# Patient Record
Sex: FEMALE | Race: WHITE | NOT HISPANIC OR LATINO | Employment: PART TIME | URBAN - METROPOLITAN AREA
[De-identification: names, ages, dates, MRNs, and addresses within clinical notes are randomized per-mention and may not be internally consistent; named-entity substitution may affect disease eponyms.]

---

## 2019-02-20 ENCOUNTER — APPOINTMENT (OUTPATIENT)
Dept: ULTRASOUND IMAGING | Facility: HOSPITAL | Age: 34
End: 2019-02-20

## 2019-02-20 ENCOUNTER — HOSPITAL ENCOUNTER (EMERGENCY)
Facility: HOSPITAL | Age: 34
Discharge: HOME | End: 2019-02-20
Attending: EMERGENCY MEDICINE | Admitting: EMERGENCY MEDICINE

## 2019-02-20 ENCOUNTER — APPOINTMENT (OUTPATIENT)
Dept: CT IMAGING | Facility: HOSPITAL | Age: 34
End: 2019-02-20

## 2019-02-20 VITALS
DIASTOLIC BLOOD PRESSURE: 73 MMHG | TEMPERATURE: 98.2 F | BODY MASS INDEX: 24.3 KG/M2 | WEIGHT: 105 LBS | SYSTOLIC BLOOD PRESSURE: 115 MMHG | RESPIRATION RATE: 16 BRPM | OXYGEN SATURATION: 98 % | HEIGHT: 55 IN | HEART RATE: 83 BPM

## 2019-02-20 DIAGNOSIS — N94.6 DYSMENORRHEA: ICD-10-CM

## 2019-02-20 DIAGNOSIS — R10.2 PELVIC PAIN: ICD-10-CM

## 2019-02-20 DIAGNOSIS — N76.0 BV (BACTERIAL VAGINOSIS): Primary | ICD-10-CM

## 2019-02-20 DIAGNOSIS — B96.89 BV (BACTERIAL VAGINOSIS): Primary | ICD-10-CM

## 2019-02-20 LAB
ANION GAP SERPL CALCULATED.3IONS-SCNC: 5 MMOL/L (ref 3–11)
B-HCG UR QL: NEGATIVE
BASOPHILS # BLD AUTO: 0.08 10*3/MM3 (ref 0–0.2)
BASOPHILS NFR BLD AUTO: 1 % (ref 0–1)
BUN BLD-MCNC: 9 MG/DL (ref 9–23)
BUN/CREAT SERPL: 13.2 (ref 7–25)
CALCIUM SPEC-SCNC: 9.7 MG/DL (ref 8.7–10.4)
CHLORIDE SERPL-SCNC: 107 MMOL/L (ref 99–109)
CLUE CELLS SPEC QL WET PREP: ABNORMAL
CO2 SERPL-SCNC: 28 MMOL/L (ref 20–31)
CREAT BLD-MCNC: 0.68 MG/DL (ref 0.6–1.3)
DEPRECATED RDW RBC AUTO: 46.3 FL (ref 37–54)
EOSINOPHIL # BLD AUTO: 0.2 10*3/MM3 (ref 0–0.3)
EOSINOPHIL NFR BLD AUTO: 2.5 % (ref 0–3)
ERYTHROCYTE [DISTWIDTH] IN BLOOD BY AUTOMATED COUNT: 13.6 % (ref 11.3–14.5)
GFR SERPL CREATININE-BSD FRML MDRD: 101 ML/MIN/1.73
GLUCOSE BLD-MCNC: 71 MG/DL (ref 70–100)
HCT VFR BLD AUTO: 40.1 % (ref 34.5–44)
HGB BLD-MCNC: 12.9 G/DL (ref 11.5–15.5)
HYDATID CYST SPEC WET PREP: ABNORMAL
IMM GRANULOCYTES # BLD AUTO: 0.01 10*3/MM3 (ref 0–0.05)
IMM GRANULOCYTES NFR BLD AUTO: 0.1 % (ref 0–0.6)
INTERNAL NEGATIVE CONTROL: NEGATIVE
INTERNAL POSITIVE CONTROL: POSITIVE
KOH PREP NAIL: NORMAL
LYMPHOCYTES # BLD AUTO: 4.31 10*3/MM3 (ref 0.6–4.8)
LYMPHOCYTES NFR BLD AUTO: 53.5 % (ref 24–44)
Lab: NORMAL
MCH RBC QN AUTO: 30 PG (ref 27–31)
MCHC RBC AUTO-ENTMCNC: 32.2 G/DL (ref 32–36)
MCV RBC AUTO: 93.3 FL (ref 80–99)
MONOCYTES # BLD AUTO: 0.4 10*3/MM3 (ref 0–1)
MONOCYTES NFR BLD AUTO: 5 % (ref 0–12)
NEUTROPHILS # BLD AUTO: 3.07 10*3/MM3 (ref 1.5–8.3)
NEUTROPHILS NFR BLD AUTO: 38 % (ref 41–71)
PLATELET # BLD AUTO: 340 10*3/MM3 (ref 150–450)
PMV BLD AUTO: 9 FL (ref 6–12)
POTASSIUM BLD-SCNC: 3.5 MMOL/L (ref 3.5–5.5)
RBC # BLD AUTO: 4.3 10*6/MM3 (ref 3.89–5.14)
SODIUM BLD-SCNC: 140 MMOL/L (ref 132–146)
T VAGINALIS SPEC QL WET PREP: ABNORMAL
WBC NRBC COR # BLD: 8.06 10*3/MM3 (ref 3.5–10.8)
WBC SPEC QL WET PREP: ABNORMAL
YEAST GENITAL QL WET PREP: ABNORMAL

## 2019-02-20 PROCEDURE — 93976 VASCULAR STUDY: CPT

## 2019-02-20 PROCEDURE — 99284 EMERGENCY DEPT VISIT MOD MDM: CPT

## 2019-02-20 PROCEDURE — 81025 URINE PREGNANCY TEST: CPT | Performed by: EMERGENCY MEDICINE

## 2019-02-20 PROCEDURE — 25010000002 IOPAMIDOL 61 % SOLUTION: Performed by: EMERGENCY MEDICINE

## 2019-02-20 PROCEDURE — 96361 HYDRATE IV INFUSION ADD-ON: CPT

## 2019-02-20 PROCEDURE — 80048 BASIC METABOLIC PNL TOTAL CA: CPT | Performed by: PHYSICIAN ASSISTANT

## 2019-02-20 PROCEDURE — 85025 COMPLETE CBC W/AUTO DIFF WBC: CPT | Performed by: PHYSICIAN ASSISTANT

## 2019-02-20 PROCEDURE — 87220 TISSUE EXAM FOR FUNGI: CPT | Performed by: PHYSICIAN ASSISTANT

## 2019-02-20 PROCEDURE — 87491 CHLMYD TRACH DNA AMP PROBE: CPT | Performed by: PHYSICIAN ASSISTANT

## 2019-02-20 PROCEDURE — 96375 TX/PRO/DX INJ NEW DRUG ADDON: CPT

## 2019-02-20 PROCEDURE — 74177 CT ABD & PELVIS W/CONTRAST: CPT

## 2019-02-20 PROCEDURE — 25010000002 ONDANSETRON PER 1 MG: Performed by: PHYSICIAN ASSISTANT

## 2019-02-20 PROCEDURE — 81025 URINE PREGNANCY TEST: CPT

## 2019-02-20 PROCEDURE — 87591 N.GONORRHOEAE DNA AMP PROB: CPT | Performed by: PHYSICIAN ASSISTANT

## 2019-02-20 PROCEDURE — 76830 TRANSVAGINAL US NON-OB: CPT

## 2019-02-20 PROCEDURE — 25010000002 KETOROLAC TROMETHAMINE PER 15 MG: Performed by: PHYSICIAN ASSISTANT

## 2019-02-20 PROCEDURE — 96374 THER/PROPH/DIAG INJ IV PUSH: CPT

## 2019-02-20 PROCEDURE — 87210 SMEAR WET MOUNT SALINE/INK: CPT | Performed by: PHYSICIAN ASSISTANT

## 2019-02-20 RX ORDER — ONDANSETRON 2 MG/ML
4 INJECTION INTRAMUSCULAR; INTRAVENOUS ONCE
Status: COMPLETED | OUTPATIENT
Start: 2019-02-20 | End: 2019-02-20

## 2019-02-20 RX ORDER — ONDANSETRON 4 MG/1
4 TABLET, FILM COATED ORAL EVERY 8 HOURS PRN
Qty: 20 TABLET | Refills: 0 | Status: SHIPPED | OUTPATIENT
Start: 2019-02-20

## 2019-02-20 RX ORDER — NAPROXEN 375 MG/1
375 TABLET ORAL 2 TIMES DAILY PRN
Qty: 14 TABLET | Refills: 0 | Status: SHIPPED | OUTPATIENT
Start: 2019-02-20

## 2019-02-20 RX ORDER — TRAMADOL HYDROCHLORIDE 50 MG/1
50 TABLET ORAL ONCE
Status: COMPLETED | OUTPATIENT
Start: 2019-02-20 | End: 2019-02-20

## 2019-02-20 RX ORDER — KETOROLAC TROMETHAMINE 15 MG/ML
10 INJECTION, SOLUTION INTRAMUSCULAR; INTRAVENOUS ONCE
Status: COMPLETED | OUTPATIENT
Start: 2019-02-20 | End: 2019-02-20

## 2019-02-20 RX ORDER — SODIUM CHLORIDE 0.9 % (FLUSH) 0.9 %
10 SYRINGE (ML) INJECTION AS NEEDED
Status: DISCONTINUED | OUTPATIENT
Start: 2019-02-20 | End: 2019-02-21 | Stop reason: HOSPADM

## 2019-02-20 RX ORDER — METRONIDAZOLE 500 MG/1
500 TABLET ORAL 3 TIMES DAILY
Qty: 21 TABLET | Refills: 0 | Status: SHIPPED | OUTPATIENT
Start: 2019-02-20

## 2019-02-20 RX ADMIN — SODIUM CHLORIDE 500 ML: 9 INJECTION, SOLUTION INTRAVENOUS at 20:17

## 2019-02-20 RX ADMIN — ONDANSETRON 4 MG: 2 INJECTION INTRAMUSCULAR; INTRAVENOUS at 20:16

## 2019-02-20 RX ADMIN — IOPAMIDOL 85 ML: 612 INJECTION, SOLUTION INTRAVENOUS at 21:25

## 2019-02-20 RX ADMIN — KETOROLAC TROMETHAMINE 10 MG: 15 INJECTION, SOLUTION INTRAMUSCULAR; INTRAVENOUS at 20:16

## 2019-02-20 RX ADMIN — TRAMADOL HYDROCHLORIDE 50 MG: 50 TABLET, FILM COATED ORAL at 22:52

## 2019-02-20 NOTE — ED PROVIDER NOTES
Subjective   33-year-old female with history of left salpingo-oophorectomy presents to emergency department with right-sided lower pelvic pain for the past one day.  Complains of pain when walking.  She has had a BTL with history of left salpingo-oophorectomy for large cyst previously.  No vaginal discharge.  No fevers chills or sweats.  She did start her menstrual period yesterday.  She denies regional trauma.  She still has her appendix.  No other abdominal surgeries.        History provided by:  Patient  Illness   Location:  Per HPI  Quality:  Per HPI  Severity:  Mild  Onset quality:  Sudden  Duration:  1 day  Timing:  Constant  Progression:  Waxing and waning  Chronicity:  New  Context:  Per HPI  Relieved by:  Per HPI  Worsened by:  Per HPI  Ineffective treatments:  None  Associated symptoms: abdominal pain    Associated symptoms: no chest pain, no fatigue, no fever, no nausea and no vomiting        Review of Systems   Constitutional: Negative for fatigue and fever.   Cardiovascular: Negative for chest pain.   Gastrointestinal: Positive for abdominal pain. Negative for nausea and vomiting.   Genitourinary: Positive for pelvic pain and vaginal bleeding. Negative for difficulty urinating, dysuria, enuresis, flank pain, frequency, genital sores, hematuria, urgency and vaginal pain.   All other systems reviewed and are negative.      History reviewed. No pertinent past medical history.    No Known Allergies    Past Surgical History:   Procedure Laterality Date   • CARPAL TUNNEL RELEASE     • OOPHORECTOMY         History reviewed. No pertinent family history.    Social History     Socioeconomic History   • Marital status: Single     Spouse name: Not on file   • Number of children: Not on file   • Years of education: Not on file   • Highest education level: Not on file   Tobacco Use   • Smoking status: Current Every Day Smoker     Packs/day: 1.00     Types: Cigarettes   • Smokeless tobacco: Never Used   Substance and  Sexual Activity   • Alcohol use: No     Frequency: Never           Objective   Physical Exam   Constitutional: She is oriented to person, place, and time. She appears well-developed and well-nourished. No distress.   HENT:   Head: Normocephalic and atraumatic.   Right Ear: External ear normal.   Left Ear: External ear normal.   Nose: Nose normal.   Mouth/Throat: Oropharynx is clear and moist. No oropharyngeal exudate.   Eyes: Conjunctivae and EOM are normal. Pupils are equal, round, and reactive to light. Right eye exhibits no discharge. Left eye exhibits no discharge. No scleral icterus.   Neck: Normal range of motion. Neck supple. No JVD present. No tracheal deviation present. No thyromegaly present.   Cardiovascular: Normal rate, regular rhythm, normal heart sounds and intact distal pulses. Exam reveals no gallop and no friction rub.   No murmur heard.  Pulmonary/Chest: Effort normal and breath sounds normal. No stridor. No respiratory distress. She has no wheezes. She has no rales. She exhibits no tenderness.   Abdominal: Soft. Bowel sounds are normal. She exhibits no distension and no mass. There is tenderness. There is no rebound and no guarding. No hernia.   Tenderness to right lower quadrant/right suprapubic area.  No definitive McBurney's point tenderness.  Very slight left lower quadrant tenderness.  Bowel sounds are normal.  Heel tap is negative.  Abdomen is soft, no guarding, no rebound.   Genitourinary:   Genitourinary Comments: Normal external genitalia, small amount of dark dried blood at introitus.  Speculum exam reveals small amount of dark thick blood in the posterior fornix, cervical os appears patent, cervix is otherwise intact.  No lesions noted.  Swabs obtained for GC chlamydia KOH and wet prep.  No adnexal tenderness, no palpable adnexal masses.  Negative cervical motion tenderness.   Musculoskeletal: Normal range of motion. She exhibits no edema, tenderness or deformity.   Neurological: She  is alert and oriented to person, place, and time. No cranial nerve deficit. She exhibits normal muscle tone. Coordination normal.   Skin: Skin is warm and dry. No rash noted. She is not diaphoretic. No erythema. No pallor.   Psychiatric: She has a normal mood and affect. Her behavior is normal. Judgment and thought content normal.   Nursing note and vitals reviewed.      Procedures           ED Course  ED Course as of Feb 20 2320 Wed Feb 20, 2019 2057 IMPRESSION:  1. LEFT salpingo-oophorectomy, multiple small follicles along the periphery of   the RIGHT ovary questionable for polycystic ovarian disease.   2. NO evidence of RIGHT ovarian torsion.   3. Otherwise unremarkable pelvic sonogram.   [TG]      ED Course User Index  [TG] uJan Patino PA-C        Recent Results (from the past 24 hour(s))   POCT, urine preg    Collection Time: 02/20/19  5:00 PM   Result Value Ref Range    HCG, Urine, QL Negative Negative    Lot Number [id number]     Internal Positive Control Positive     Internal Negative Control Negative    CBC Auto Differential    Collection Time: 02/20/19  7:21 PM   Result Value Ref Range    WBC 8.06 3.50 - 10.80 10*3/mm3    RBC 4.30 3.89 - 5.14 10*6/mm3    Hemoglobin 12.9 11.5 - 15.5 g/dL    Hematocrit 40.1 34.5 - 44.0 %    MCV 93.3 80.0 - 99.0 fL    MCH 30.0 27.0 - 31.0 pg    MCHC 32.2 32.0 - 36.0 g/dL    RDW 13.6 11.3 - 14.5 %    RDW-SD 46.3 37.0 - 54.0 fl    MPV 9.0 6.0 - 12.0 fL    Platelets 340 150 - 450 10*3/mm3    Neutrophil % 38.0 (L) 41.0 - 71.0 %    Lymphocyte % 53.5 (H) 24.0 - 44.0 %    Monocyte % 5.0 0.0 - 12.0 %    Eosinophil % 2.5 0.0 - 3.0 %    Basophil % 1.0 0.0 - 1.0 %    Immature Grans % 0.1 0.0 - 0.6 %    Neutrophils, Absolute 3.07 1.50 - 8.30 10*3/mm3    Lymphocytes, Absolute 4.31 0.60 - 4.80 10*3/mm3    Monocytes, Absolute 0.40 0.00 - 1.00 10*3/mm3    Eosinophils, Absolute 0.20 0.00 - 0.30 10*3/mm3    Basophils, Absolute 0.08 0.00 - 0.20 10*3/mm3    Immature Grans, Absolute  0.01 0.00 - 0.05 10*3/mm3   Basic Metabolic Panel    Collection Time: 02/20/19  7:21 PM   Result Value Ref Range    Glucose 71 70 - 100 mg/dL    BUN 9 9 - 23 mg/dL    Creatinine 0.68 0.60 - 1.30 mg/dL    Sodium 140 132 - 146 mmol/L    Potassium 3.5 3.5 - 5.5 mmol/L    Chloride 107 99 - 109 mmol/L    CO2 28.0 20.0 - 31.0 mmol/L    Calcium 9.7 8.7 - 10.4 mg/dL    eGFR Non African Amer 101 >60 mL/min/1.73    BUN/Creatinine Ratio 13.2 7.0 - 25.0    Anion Gap 5.0 3.0 - 11.0 mmol/L   KOH Prep - Swab, Vagina    Collection Time: 02/20/19  8:50 PM   Result Value Ref Range    KOH Prep No yeast or hyphal elements seen No yeast or hyphal elements seen   Wet Prep, Genital - Swab, Vagina    Collection Time: 02/20/19  8:50 PM   Result Value Ref Range    YEAST No yeast seen No yeast seen    HYPHAL ELEMENTS No Hyphal elements seen No Hyphal elements seen    WBC'S 2+ WBC's seen (A) No WBC's seen    Clue Cells, Wet Prep No Clue cells seen No Clue cells seen    Trichomonas, Wet Prep No Trichomonas seen No Trichomonas seen     Note: In addition to lab results from this visit, the labs listed above may include labs taken at another facility or during a different encounter within the last 24 hours. Please correlate lab times with ED admission and discharge times for further clarification of the services performed during this visit.    CT Abdomen Pelvis With Contrast   Final Result   Unremarkable abdominopelvic study without an acute abnormality.       COMMENT:     NORMAL APPENDIX without CT evidence of acute appendicitis.       THIS DOCUMENT HAS BEEN ELECTRONICALLY SIGNED BY AD KLEIN MD      US Non-ob Transvaginal   ED Interpretation   1. LEFT salpingo-oophorectomy, multiple small follicles along the periphery of    the RIGHT ovary questionable for polycystic ovarian disease.    2. NO evidence of RIGHT ovarian torsion.    3. Otherwise unremarkable pelvic sonogram.       THIS DOCUMENT HAS BEEN ELECTRONICALLY SIGNED BY AD KLEIN MD  "     Final Result   1. LEFT salpingo-oophorectomy, multiple small follicles along the periphery of    the RIGHT ovary questionable for polycystic ovarian disease.    2. NO evidence of RIGHT ovarian torsion.    3. Otherwise unremarkable pelvic sonogram.       THIS DOCUMENT HAS BEEN ELECTRONICALLY SIGNED BY AD KLEIN MD       Testicular or Ovarian Vascular Limited   Final Result   1. LEFT salpingo-oophorectomy, multiple small follicles along the periphery of    the RIGHT ovary questionable for polycystic ovarian disease.    2. NO evidence of RIGHT ovarian torsion.    3. Otherwise unremarkable pelvic sonogram.       THIS DOCUMENT HAS BEEN ELECTRONICALLY SIGNED BY AD KLEIN MD        Vitals:    02/20/19 1531 02/20/19 2137 02/20/19 2245 02/20/19 2253   BP:  120/85  115/73   BP Location:    Right arm   Patient Position:    Lying   Pulse:    83   Resp:    16   Temp:       TempSrc:       SpO2:   97% 98%   Weight: 47.6 kg (105 lb)      Height: 121.9 cm (48\")        Medications   sodium chloride 0.9 % flush 10 mL (not administered)   sodium chloride 0.9 % bolus 500 mL (0 mL Intravenous Stopped 2/20/19 2141)   ondansetron (ZOFRAN) injection 4 mg (4 mg Intravenous Given 2/20/19 2016)   ketorolac (TORADOL) injection 10 mg (10 mg Intravenous Given 2/20/19 2016)   iopamidol (ISOVUE-300) 61 % injection 100 mL (85 mL Intravenous Given 2/20/19 2125)   traMADol (ULTRAM) tablet 50 mg (50 mg Oral Given 2/20/19 2252)     ECG/EMG Results (last 24 hours)     ** No results found for the last 24 hours. **        No orders to display                 MDM      Final diagnoses:   Pelvic pain   Dysmenorrhea   BV (bacterial vaginosis)            Juan Patino PA-C  02/20/19 2840    "

## 2019-02-21 NOTE — DISCHARGE INSTRUCTIONS
Pelvic rest, follow-up with Dr. Donaldson, call tomorrow morning to schedule your earliest available appointment.  Return to emergency department immediately if any change or worsening of symptoms.

## 2019-02-22 LAB
C TRACH RRNA SPEC DONR QL NAA+PROBE: NEGATIVE
N GONORRHOEA DNA SPEC QL NAA+PROBE: NEGATIVE

## 2019-12-26 ENCOUNTER — TELEPHONE (OUTPATIENT)
Dept: ORTHOPEDIC SURGERY | Facility: CLINIC | Age: 34
End: 2019-12-26